# Patient Record
Sex: MALE | Race: WHITE | NOT HISPANIC OR LATINO | Employment: UNEMPLOYED | URBAN - METROPOLITAN AREA
[De-identification: names, ages, dates, MRNs, and addresses within clinical notes are randomized per-mention and may not be internally consistent; named-entity substitution may affect disease eponyms.]

---

## 2019-06-10 ENCOUNTER — OFFICE VISIT (OUTPATIENT)
Dept: FAMILY MEDICINE | Facility: CLINIC | Age: 24
End: 2019-06-10
Payer: MEDICARE

## 2019-06-10 VITALS
HEIGHT: 71 IN | TEMPERATURE: 99 F | HEART RATE: 84 BPM | BODY MASS INDEX: 17.64 KG/M2 | OXYGEN SATURATION: 96 % | DIASTOLIC BLOOD PRESSURE: 84 MMHG | WEIGHT: 126 LBS | RESPIRATION RATE: 20 BRPM | SYSTOLIC BLOOD PRESSURE: 112 MMHG

## 2019-06-10 DIAGNOSIS — J02.9 PHARYNGITIS, UNSPECIFIED ETIOLOGY: Primary | ICD-10-CM

## 2019-06-10 DIAGNOSIS — R05.9 COUGH: ICD-10-CM

## 2019-06-10 PROCEDURE — 99214 OFFICE O/P EST MOD 30 MIN: CPT | Mod: S$GLB,,, | Performed by: FAMILY MEDICINE

## 2019-06-10 PROCEDURE — 99214 PR OFFICE/OUTPT VISIT, EST, LEVL IV, 30-39 MIN: ICD-10-PCS | Mod: S$GLB,,, | Performed by: FAMILY MEDICINE

## 2019-06-10 RX ORDER — IBUPROFEN 100 MG/1
100 TABLET, CHEWABLE ORAL
COMMUNITY

## 2019-06-10 RX ORDER — CODEINE PHOSPHATE AND GUAIFENESIN 10; 100 MG/5ML; MG/5ML
10 SOLUTION ORAL EVERY 6 HOURS PRN
Qty: 120 ML | Refills: 0 | Status: SHIPPED | OUTPATIENT
Start: 2019-06-10 | End: 2019-06-20

## 2019-06-10 RX ORDER — AMOXICILLIN AND CLAVULANATE POTASSIUM 400; 57 MG/5ML; MG/5ML
10 POWDER, FOR SUSPENSION ORAL 2 TIMES DAILY
Qty: 200 ML | Refills: 0 | Status: SHIPPED | OUTPATIENT
Start: 2019-06-10 | End: 2020-11-10

## 2019-06-10 RX ORDER — PREDNISONE 5 MG/ML
20 SOLUTION ORAL DAILY
Qty: 100 ML | Refills: 0 | Status: SHIPPED | OUTPATIENT
Start: 2019-06-10 | End: 2019-06-15

## 2019-06-10 NOTE — PROGRESS NOTES
Subjective:      Patient ID: Wilfred Gordon is a 24 y.o. male.    Chief Complaint: Sore Throat and Cough      HPI:  24-year-old white male past medical history of anxiety who presents with nausea vomiting diarrhea cough and congestion. Symptoms began 4-5 days ago.  Started with nausea vomiting and diarrhea.  That lasted 2 days then went away.  He still has decreased appetite.  Has sore throat.  Hurts to swallow.  Not eating that well.  He is tolerating some liquids.  Has not vomited in 2-3 days.  Has minimal abdominal pain. Has a dry cough.  Tried OTC meds with no improvement.    History reviewed. No pertinent past medical history.  Past Surgical History:   Procedure Laterality Date    EYE SURGERY      tubes     History reviewed. No pertinent family history.  Social History     Socioeconomic History    Marital status: Single     Spouse name: Not on file    Number of children: Not on file    Years of education: Not on file    Highest education level: Not on file   Occupational History    Not on file   Social Needs    Financial resource strain: Not on file    Food insecurity:     Worry: Not on file     Inability: Not on file    Transportation needs:     Medical: Not on file     Non-medical: Not on file   Tobacco Use    Smoking status: Never Smoker   Substance and Sexual Activity    Alcohol use: Never     Frequency: Never    Drug use: Never    Sexual activity: Not on file   Lifestyle    Physical activity:     Days per week: Not on file     Minutes per session: Not on file    Stress: Not on file   Relationships    Social connections:     Talks on phone: Not on file     Gets together: Not on file     Attends Shinto service: Not on file     Active member of club or organization: Not on file     Attends meetings of clubs or organizations: Not on file     Relationship status: Not on file   Other Topics Concern    Not on file   Social History Narrative    Not on file     Review of patient's allergies  "indicates:  No Known Allergies    Review of Systems   Constitutional: Positive for appetite change and fatigue. Negative for chills and fever.   HENT: Positive for congestion, ear pain, rhinorrhea, sinus pressure, sinus pain, sneezing, sore throat and trouble swallowing.    Eyes: Positive for pain and itching. Negative for photophobia.   Respiratory: Positive for cough. Negative for shortness of breath and wheezing.    Cardiovascular: Negative for chest pain.   Gastrointestinal: Positive for diarrhea, nausea and vomiting. Negative for abdominal pain.   Endocrine: Negative for cold intolerance and heat intolerance.   Genitourinary: Negative for difficulty urinating and frequency.   Musculoskeletal: Negative for arthralgias, joint swelling and myalgias.   Skin: Negative for pallor and rash.   Neurological: Negative for dizziness, seizures, syncope, speech difficulty and headaches.   Hematological: Negative for adenopathy. Does not bruise/bleed easily.   Psychiatric/Behavioral: Negative for agitation, behavioral problems and hallucinations.       Objective:       /84 (BP Location: Right arm, Patient Position: Sitting, BP Method: Medium (Manual))   Pulse 84   Temp 99 °F (37.2 °C) (Oral)   Resp 20   Ht 5' 11" (1.803 m)   Wt 57.2 kg (126 lb)   SpO2 96%   BMI 17.57 kg/m²   Physical Exam   Constitutional: He is oriented to person, place, and time. He appears well-developed and well-nourished.   HENT:   Head: Normocephalic and atraumatic.   Right Ear: Tympanic membrane is injected. Tympanic membrane is not perforated. A middle ear effusion is present.   Left Ear: Tympanic membrane is injected. Tympanic membrane is not perforated. A middle ear effusion is present.   Nose: Right sinus exhibits maxillary sinus tenderness and frontal sinus tenderness. Left sinus exhibits maxillary sinus tenderness and frontal sinus tenderness.   Mouth/Throat: Uvula is midline and mucous membranes are normal. Posterior oropharyngeal " erythema present.   Eyes: Conjunctivae and EOM are normal. Right eye exhibits no discharge. Left eye exhibits no discharge.   Neck: Normal range of motion. Neck supple.   Cardiovascular: Normal rate, regular rhythm and normal heart sounds.   Pulmonary/Chest: Effort normal and breath sounds normal.   Abdominal: Soft. Bowel sounds are normal. He exhibits no distension and no mass. There is no tenderness. There is no rebound and no guarding.   Musculoskeletal: Normal range of motion.   Neurological: He is alert and oriented to person, place, and time.   Skin: Skin is warm and dry.       Assessment:     1. Pharyngitis, unspecified etiology    2. Cough        Plan:   Pharyngitis, unspecified etiology  -     POCT RAPID STREP A  -     amoxicillin-clavulanate (AUGMENTIN) 400-57 mg/5 mL SusR; Take 10 mLs by mouth 2 (two) times daily.  Dispense: 200 mL; Refill: 0  -     predniSONE 5 mg/5 ml 5 mg/5 mL Soln; Take 20 mLs (20 mg total) by mouth once daily. for 5 days  Dispense: 100 mL; Refill: 0  -     guaifenesin-codeine 100-10 mg/5 ml (CHERATUSSIN AC)  mg/5 mL syrup; Take 10 mLs by mouth every 6 (six) hours as needed.  Dispense: 120 mL; Refill: 0    Cough      Discussed possible mono.  Patient declined blood work.  If rash occurs will obtain Monospot.  Avoid sports or horse play.  Increase fluid intake.  Tylenol ibuprofen p.r.n.    Medication List with Changes/Refills   New Medications    AMOXICILLIN-CLAVULANATE (AUGMENTIN) 400-57 MG/5 ML SUSR    Take 10 mLs by mouth 2 (two) times daily.    GUAIFENESIN-CODEINE 100-10 MG/5 ML (CHERATUSSIN AC)  MG/5 ML SYRUP    Take 10 mLs by mouth every 6 (six) hours as needed.    PREDNISONE 5 MG/5 ML 5 MG/5 ML SOLN    Take 20 mLs (20 mg total) by mouth once daily. for 5 days   Current Medications    IBUPROFEN 100 MG CHEW    Take 100 mg by mouth as needed.

## 2020-01-21 ENCOUNTER — OFFICE VISIT (OUTPATIENT)
Dept: FAMILY MEDICINE | Facility: CLINIC | Age: 25
End: 2020-01-21
Payer: MEDICARE

## 2020-01-21 VITALS
BODY MASS INDEX: 18.62 KG/M2 | HEART RATE: 68 BPM | OXYGEN SATURATION: 98 % | RESPIRATION RATE: 18 BRPM | TEMPERATURE: 99 F | DIASTOLIC BLOOD PRESSURE: 78 MMHG | WEIGHT: 133 LBS | SYSTOLIC BLOOD PRESSURE: 126 MMHG | HEIGHT: 71 IN

## 2020-01-21 DIAGNOSIS — F41.0 PANIC ATTACK: ICD-10-CM

## 2020-01-21 DIAGNOSIS — F41.9 ANXIETY: Primary | ICD-10-CM

## 2020-01-21 DIAGNOSIS — R11.0 NAUSEA: ICD-10-CM

## 2020-01-21 DIAGNOSIS — R51.9 NONINTRACTABLE HEADACHE, UNSPECIFIED CHRONICITY PATTERN, UNSPECIFIED HEADACHE TYPE: ICD-10-CM

## 2020-01-21 PROCEDURE — 99214 OFFICE O/P EST MOD 30 MIN: CPT | Mod: S$GLB,,, | Performed by: FAMILY MEDICINE

## 2020-01-21 PROCEDURE — 99214 PR OFFICE/OUTPT VISIT, EST, LEVL IV, 30-39 MIN: ICD-10-PCS | Mod: S$GLB,,, | Performed by: FAMILY MEDICINE

## 2020-01-21 RX ORDER — HYDROXYZINE HYDROCHLORIDE 10 MG/5ML
10 SYRUP ORAL EVERY 6 HOURS PRN
Qty: 450 ML | Refills: 0 | Status: SHIPPED | OUTPATIENT
Start: 2020-01-21 | End: 2020-11-10 | Stop reason: SDUPTHER

## 2020-01-21 NOTE — PROGRESS NOTES
Subjective:      Patient ID: Wilfred Gordon is a 24 y.o. male.    Chief Complaint: Headache (2 months/frequent headaches/made sure he drinks lots of h2o/occasional vomiting due to severity/took childrens tylenol due to not being able to swallow pills/mom noticed if he gets nervous or excited he gets a headache)      HPI:  24-year-old white male past medical history of anxiety who presents with headaches.  Headache occurs mostly on Sundays.  Occurs when he has to go to [facility].  Occurs when he has to go in public.  Mother reports he does get all anxious when he goes places.  He sometimes has associated nausea.  Headache located behind bilateral eyes.  Had eye exam recently that was normal.  No focal deficits.    History reviewed. No pertinent past medical history.  Past Surgical History:   Procedure Laterality Date    EYE SURGERY      tubes     History reviewed. No pertinent family history.  Social History     Socioeconomic History    Marital status: Single     Spouse name: Not on file    Number of children: Not on file    Years of education: Not on file    Highest education level: Not on file   Occupational History    Not on file   Social Needs    Financial resource strain: Not on file    Food insecurity:     Worry: Not on file     Inability: Not on file    Transportation needs:     Medical: Not on file     Non-medical: Not on file   Tobacco Use    Smoking status: Never Smoker   Substance and Sexual Activity    Alcohol use: Never     Frequency: Never    Drug use: Never    Sexual activity: Not on file   Lifestyle    Physical activity:     Days per week: Not on file     Minutes per session: Not on file    Stress: Not on file   Relationships    Social connections:     Talks on phone: Not on file     Gets together: Not on file     Attends Holiness service: Not on file     Active member of club or organization: Not on file     Attends meetings of clubs or organizations: Not on file     Relationship  "status: Not on file   Other Topics Concern    Not on file   Social History Narrative    Not on file     Review of patient's allergies indicates:  No Known Allergies    Review of Systems   Constitutional: Negative for activity change, appetite change, chills, fatigue, fever and unexpected weight change.   HENT: Negative for congestion, ear pain, rhinorrhea, sinus pressure, sinus pain, sneezing, sore throat and trouble swallowing.    Eyes: Negative for photophobia, pain and itching.   Respiratory: Negative for cough, chest tightness, shortness of breath and wheezing.    Cardiovascular: Negative for chest pain, palpitations and leg swelling.   Gastrointestinal: Negative for abdominal distention, abdominal pain, constipation, diarrhea, nausea and vomiting.   Endocrine: Negative for cold intolerance, heat intolerance, polydipsia and polyphagia.   Genitourinary: Negative for difficulty urinating, dysuria and frequency.   Musculoskeletal: Negative for arthralgias, joint swelling and myalgias.   Skin: Negative for pallor and rash.   Neurological: Positive for headaches. Negative for dizziness, seizures, syncope and speech difficulty.   Hematological: Negative for adenopathy. Does not bruise/bleed easily.   Psychiatric/Behavioral: Negative for agitation, behavioral problems and hallucinations. The patient is nervous/anxious.        Objective:       /78 (BP Location: Left arm, Patient Position: Sitting, BP Method: Medium (Manual))   Pulse 68   Temp 98.6 °F (37 °C) (Oral)   Resp 18   Ht 5' 11" (1.803 m)   Wt 60.3 kg (133 lb)   SpO2 98%   BMI 18.55 kg/m²   Physical Exam   Constitutional: He is oriented to person, place, and time. He appears well-developed and well-nourished.   HENT:   Head: Normocephalic and atraumatic.   Nose: Nose normal.   Mouth/Throat: Oropharynx is clear and moist.   Eyes: Pupils are equal, round, and reactive to light. Conjunctivae and EOM are normal.   Neck: Normal range of motion. Neck " supple.   Cardiovascular: Normal rate, regular rhythm, normal heart sounds and intact distal pulses.   Pulmonary/Chest: Effort normal and breath sounds normal.   Abdominal: Soft. There is no tenderness.   Musculoskeletal: Normal range of motion.   Neurological: He is alert and oriented to person, place, and time. He displays normal reflexes. No cranial nerve deficit. He exhibits normal muscle tone. Coordination normal.   Skin: Skin is warm and dry.   Psychiatric: He has a normal mood and affect. His behavior is normal. Judgment and thought content normal.   Nursing note and vitals reviewed.      Assessment:     1. Anxiety    2. Nonintractable headache, unspecified chronicity pattern, unspecified headache type    3. Nausea    4. Panic attack        Plan:   Anxiety    Nonintractable headache, unspecified chronicity pattern, unspecified headache type    Nausea    Panic attack    Other orders  -     hydrOXYzine (ATARAX) 10 mg/5 mL syrup; Take 5 mLs (10 mg total) by mouth every 6 (six) hours as needed for Itching.  Dispense: 450 mL; Refill: 0      Patient can only take liquid medication.  This limits are medications that we can use.  He is taking Valium in the past.  We prefer not to use this on a regular basis.    Trial of hydroxyzine.  Consider Paxil this also has a liquid form.  Consider Phenergan.  Consider Maxalt this is migraines.    Consider MRI.  Given that symptoms only occur around once weekly this is more consistent with some type of anxiety reaction.    Medication List with Changes/Refills   New Medications    HYDROXYZINE (ATARAX) 10 MG/5 ML SYRUP    Take 5 mLs (10 mg total) by mouth every 6 (six) hours as needed for Itching.   Current Medications    AMOXICILLIN-CLAVULANATE (AUGMENTIN) 400-57 MG/5 ML SUSR    Take 10 mLs by mouth 2 (two) times daily.    IBUPROFEN 100 MG CHEW    Take 100 mg by mouth as needed.            Disclaimer: This note may have been prepared using voice recognition software, it may have  not been extensively proofed, as such there could be errors within the text such as sound alike errors.

## 2020-01-29 ENCOUNTER — PATIENT OUTREACH (OUTPATIENT)
Dept: ADMINISTRATIVE | Facility: HOSPITAL | Age: 25
End: 2020-01-29

## 2020-01-29 NOTE — PROGRESS NOTES
Health Maintenance Updated.  Immunizations: Abstracted.  [facility] abstracted  [facility] abstracted     No records found

## 2020-06-24 ENCOUNTER — TELEPHONE (OUTPATIENT)
Dept: FAMILY MEDICINE | Facility: CLINIC | Age: 25
End: 2020-06-24

## 2020-06-24 NOTE — TELEPHONE ENCOUNTER
----- Message from Zoey Foster sent at 6/23/2020 10:08 AM CDT -----  Regarding: Medication refill  Contact: Patient mother  Type:  RX Refill Request    Who Called: Patient mother Kristy Gordon   Refill or New Rx: Refill   RX Name and Strength:hydrOXYzine (ATARAX) 10 mg/5 mL syrup  How is the patient currently taking it? (ex. 1XDay): As needed   Is this a 30 day or 90 day RX 30 day   Preferred Pharmacy with phone number: [facility]   [phone]  Local or Mail Order: local   Ordering Provider: Dr Mclain   Would the patient rather a call back or a response via [facility]?  Call back   Best Call Back Number: [phone]  Additional Information:

## 2020-06-24 NOTE — TELEPHONE ENCOUNTER
Called patient's mother informed that medication was called in to patient pharmacy. Patient mother stated understanding.

## 2020-08-12 ENCOUNTER — TELEPHONE (OUTPATIENT)
Dept: FAMILY MEDICINE | Facility: CLINIC | Age: 25
End: 2020-08-12

## 2020-08-12 NOTE — TELEPHONE ENCOUNTER
----- Message from Leanne Welch sent at 8/12/2020 11:23 AM CDT -----  Regarding: patient refill  Contact: patient's wife  Type:  RX Refill Request    Who Called: patient's mom  Refill or New Rx:refill  RX Name and Strength: hydrOXYzine (ATARAX) 10 mg/5 mL syrup  How is the patient currently taking it? (ex. 1XDay):as needed  Is this a 30 day or 90 day RX: 90day  Preferred Pharmacy with phone number:  [facility] [address] [address]  Phone: [phone] Fax: [phone]  Local or Mail Order:local  Ordering Provider:Daniel Mclain  Would the patient rather a call back or a response via [facility]? Call back  Best Call Back Number:[phone]  Additional Information: patient's mom stated she would like to get the supply she received the first time that came with two bottles so it can last longer being that he cannot take pills

## 2020-11-09 ENCOUNTER — TELEPHONE (OUTPATIENT)
Dept: FAMILY MEDICINE | Facility: CLINIC | Age: 25
End: 2020-11-09

## 2020-11-09 NOTE — TELEPHONE ENCOUNTER
----- Message from Morris Hyman sent at 11/9/2020  2:56 PM CST -----  Contact: mom-keyonna  Type:  RX Refill Request    Who Called: mom  Refill or New Rx:refill  RX Name and Strength:hydroxyzine syrup   How is the patient currently taking it? (ex. 1XDay):as needed  Is this a 30 day or 90 day RX: n/a  Preferred Pharmacy with phone number:  [facility] [address] [address]  Phone: [phone] Fax: [phone]  Local or Mail Order:local  Ordering Provider:meg  Would the patient rather a call back or a response via [facility]? Call back  Best Call Back Number:[phone]  Additional Information: none

## 2020-11-09 NOTE — TELEPHONE ENCOUNTER
Called no answer left message on voicemail informed that patient is going to need a follow up appointment for refill on medicaton.

## 2020-11-10 ENCOUNTER — OFFICE VISIT (OUTPATIENT)
Dept: FAMILY MEDICINE | Facility: CLINIC | Age: 25
End: 2020-11-10
Payer: MEDICARE

## 2020-11-10 VITALS
SYSTOLIC BLOOD PRESSURE: 118 MMHG | WEIGHT: 143 LBS | BODY MASS INDEX: 20.02 KG/M2 | RESPIRATION RATE: 18 BRPM | HEART RATE: 71 BPM | HEIGHT: 71 IN | OXYGEN SATURATION: 99 % | TEMPERATURE: 98 F | DIASTOLIC BLOOD PRESSURE: 84 MMHG

## 2020-11-10 DIAGNOSIS — R51.9 NONINTRACTABLE HEADACHE, UNSPECIFIED CHRONICITY PATTERN, UNSPECIFIED HEADACHE TYPE: ICD-10-CM

## 2020-11-10 DIAGNOSIS — Z23 IMMUNIZATION DUE: ICD-10-CM

## 2020-11-10 DIAGNOSIS — F41.9 ANXIETY: Primary | ICD-10-CM

## 2020-11-10 PROCEDURE — 99213 OFFICE O/P EST LOW 20 MIN: CPT | Mod: S$GLB,,, | Performed by: FAMILY MEDICINE

## 2020-11-10 PROCEDURE — 99213 PR OFFICE/OUTPT VISIT, EST, LEVL III, 20-29 MIN: ICD-10-PCS | Mod: S$GLB,,, | Performed by: FAMILY MEDICINE

## 2020-11-10 RX ORDER — HYDROXYZINE HYDROCHLORIDE 10 MG/5ML
10 SYRUP ORAL EVERY 6 HOURS PRN
Qty: 450 ML | Refills: 3 | Status: SHIPPED | OUTPATIENT
Start: 2020-11-10

## 2020-11-10 NOTE — PROGRESS NOTES
Subjective:      Patient ID: Wilfred Gordon is a 25 y.o. male.    Chief Complaint: Other (medication refill)      HPI:  25-year-old white male past history of anxiety who presents for anxiety and headaches.  Uses Vistaril as needed.  Uses around twice a week.  It helped.  It does occasion make him sleepy.  Does not feel like he needs daily medication.  He does have intermittent allergies.  He did have some sinus congestion 3 weeks ago.  This has since improved.  Headaches are stable    No past medical history on file.  Past Surgical History:   Procedure Laterality Date    EYE SURGERY      tubes     No family history on file.  Social History     Socioeconomic History    Marital status: Single     Spouse name: Not on file    Number of children: Not on file    Years of education: Not on file    Highest education level: Not on file   Occupational History    Not on file   Social Needs    Financial resource strain: Not on file    Food insecurity     Worry: Not on file     Inability: Not on file    Transportation needs     Medical: Not on file     Non-medical: Not on file   Tobacco Use    Smoking status: Never Smoker   Substance and Sexual Activity    Alcohol use: Never     Frequency: Never    Drug use: Never    Sexual activity: Not on file   Lifestyle    Physical activity     Days per week: Not on file     Minutes per session: Not on file    Stress: Not on file   Relationships    Social connections     Talks on phone: Not on file     Gets together: Not on file     Attends Anglican service: Not on file     Active member of club or organization: Not on file     Attends meetings of clubs or organizations: Not on file     Relationship status: Not on file   Other Topics Concern    Not on file   Social History Narrative    Not on file     Review of patient's allergies indicates:  No Known Allergies    Review of Systems   Constitutional: Negative for activity change, appetite change, chills, fatigue, fever  "and unexpected weight change.   HENT: Negative for congestion, ear pain, rhinorrhea, sinus pressure, sinus pain, sneezing, sore throat and trouble swallowing.    Eyes: Negative for photophobia, pain and itching.   Respiratory: Negative for cough, chest tightness, shortness of breath and wheezing.    Cardiovascular: Negative for chest pain, palpitations and leg swelling.   Gastrointestinal: Negative for abdominal distention, abdominal pain, constipation, diarrhea, nausea and vomiting.   Endocrine: Negative for cold intolerance, heat intolerance, polydipsia and polyphagia.   Genitourinary: Negative for difficulty urinating, dysuria and frequency.   Musculoskeletal: Negative for arthralgias, joint swelling and myalgias.   Skin: Negative for pallor and rash.   Neurological: Positive for headaches. Negative for dizziness, seizures, syncope and speech difficulty.   Hematological: Negative for adenopathy. Does not bruise/bleed easily.   Psychiatric/Behavioral: Negative for agitation, behavioral problems and hallucinations. The patient is nervous/anxious.        Objective:       /84 (BP Location: Left arm, Patient Position: Sitting, BP Method: Medium (Manual))   Pulse 71   Temp 98.4 °F (36.9 °C) (Oral)   Resp 18   Ht 5' 11" (1.803 m)   Wt 64.9 kg (143 lb)   SpO2 99%   BMI 19.94 kg/m²   Physical Exam  Vitals signs and nursing note reviewed.   Constitutional:       Appearance: He is well-developed.   HENT:      Head: Normocephalic and atraumatic.      Nose: Nose normal.   Eyes:      Conjunctiva/sclera: Conjunctivae normal.      Pupils: Pupils are equal, round, and reactive to light.   Neck:      Musculoskeletal: Normal range of motion and neck supple.   Cardiovascular:      Rate and Rhythm: Normal rate and regular rhythm.      Heart sounds: Normal heart sounds.   Pulmonary:      Effort: Pulmonary effort is normal.      Breath sounds: Normal breath sounds.   Abdominal:      Palpations: Abdomen is soft.      " Tenderness: There is no abdominal tenderness.   Musculoskeletal: Normal range of motion.   Skin:     General: Skin is warm and dry.   Neurological:      Mental Status: He is alert and oriented to person, place, and time.   Psychiatric:         Behavior: Behavior normal.         Thought Content: Thought content normal.         Judgment: Judgment normal.         Assessment:     1. Anxiety    2. Nonintractable headache, unspecified chronicity pattern, unspecified headache type    3. Immunization due        Plan:   Anxiety  -     hydrOXYzine (ATARAX) 10 mg/5 mL syrup; Take 5 mLs (10 mg total) by mouth every 6 (six) hours as needed for Itching.  Dispense: 450 mL; Refill: 3    Nonintractable headache, unspecified chronicity pattern, unspecified headache type    Immunization due      Continue hydroxyzine as needed.    Plan for blood work on follow-up.    Declined flu vaccine.    Medication List with Changes/Refills   Current Medications    IBUPROFEN 100 MG CHEW    Take 100 mg by mouth as needed.   Changed and/or Refilled Medications    Modified Medication Previous Medication    HYDROXYZINE (ATARAX) 10 MG/5 ML SYRUP hydrOXYzine (ATARAX) 10 mg/5 mL syrup       Take 5 mLs (10 mg total) by mouth every 6 (six) hours as needed for Itching.    Take 5 mLs (10 mg total) by mouth every 6 (six) hours as needed for Itching.   Discontinued Medications    AMOXICILLIN-CLAVULANATE (AUGMENTIN) 400-57 MG/5 ML SUSR    Take 10 mLs by mouth 2 (two) times daily.            Disclaimer: This note may have been prepared using voice recognition software, it may have not been extensively proofed, as such there could be errors within the text such as sound alike errors.

## 2022-08-18 ENCOUNTER — PATIENT OUTREACH (OUTPATIENT)
Dept: ADMINISTRATIVE | Facility: HOSPITAL | Age: 27
End: 2022-08-18
Payer: MEDICARE